# Patient Record
Sex: MALE | Race: WHITE | HISPANIC OR LATINO | Employment: FULL TIME | URBAN - METROPOLITAN AREA
[De-identification: names, ages, dates, MRNs, and addresses within clinical notes are randomized per-mention and may not be internally consistent; named-entity substitution may affect disease eponyms.]

---

## 2017-01-18 ENCOUNTER — HOSPITAL ENCOUNTER (EMERGENCY)
Facility: HOSPITAL | Age: 57
End: 2017-01-18
Attending: EMERGENCY MEDICINE
Payer: COMMERCIAL

## 2017-01-18 ENCOUNTER — APPOINTMENT (EMERGENCY)
Dept: RADIOLOGY | Facility: HOSPITAL | Age: 57
End: 2017-01-18
Payer: COMMERCIAL

## 2017-01-18 ENCOUNTER — HOSPITAL ENCOUNTER (INPATIENT)
Facility: HOSPITAL | Age: 57
LOS: 2 days | Discharge: HOME | DRG: 247 | End: 2017-01-20
Attending: INTERNAL MEDICINE | Admitting: INTERNAL MEDICINE
Payer: COMMERCIAL

## 2017-01-18 VITALS
BODY MASS INDEX: 32.64 KG/M2 | RESPIRATION RATE: 18 BRPM | HEIGHT: 68 IN | OXYGEN SATURATION: 96 % | DIASTOLIC BLOOD PRESSURE: 69 MMHG | HEART RATE: 62 BPM | SYSTOLIC BLOOD PRESSURE: 104 MMHG | WEIGHT: 215.39 LBS | TEMPERATURE: 98.6 F

## 2017-01-18 DIAGNOSIS — I25.10 CORONARY ARTERY DISEASE INVOLVING NATIVE CORONARY ARTERY: ICD-10-CM

## 2017-01-18 DIAGNOSIS — E11.9 TYPE 2 DIABETES MELLITUS (HCC): Primary | Chronic | ICD-10-CM

## 2017-01-18 DIAGNOSIS — I21.4 NSTEMI (NON-ST ELEVATION MYOCARDIAL INFARCTION) (HCC): ICD-10-CM

## 2017-01-18 DIAGNOSIS — I21.4 NSTEMI (NON-ST ELEVATED MYOCARDIAL INFARCTION) (HCC): Primary | ICD-10-CM

## 2017-01-18 PROBLEM — I10 HYPERTENSION: Status: ACTIVE | Noted: 2017-01-18

## 2017-01-18 PROBLEM — E78.5 DYSLIPIDEMIA: Chronic | Status: ACTIVE | Noted: 2017-01-18

## 2017-01-18 LAB
ANION GAP SERPL CALCULATED.3IONS-SCNC: 9 MMOL/L (ref 4–13)
ATRIAL RATE: 69 BPM
BASOPHILS # BLD AUTO: 0.02 THOUSANDS/ΜL (ref 0–0.1)
BASOPHILS NFR BLD AUTO: 0 % (ref 0–1)
BUN SERPL-MCNC: 23 MG/DL (ref 5–25)
CALCIUM SERPL-MCNC: 9.1 MG/DL (ref 8.3–10.1)
CHLORIDE SERPL-SCNC: 106 MMOL/L (ref 100–108)
CO2 SERPL-SCNC: 24 MMOL/L (ref 21–32)
CREAT SERPL-MCNC: 0.9 MG/DL (ref 0.6–1.3)
EOSINOPHIL # BLD AUTO: 0.06 THOUSAND/ΜL (ref 0–0.61)
EOSINOPHIL NFR BLD AUTO: 1 % (ref 0–6)
ERYTHROCYTE [DISTWIDTH] IN BLOOD BY AUTOMATED COUNT: 12.5 % (ref 11.6–15.1)
GFR SERPL CREATININE-BSD FRML MDRD: >60 ML/MIN/1.73SQ M
GLUCOSE SERPL-MCNC: 172 MG/DL (ref 65–140)
HCT VFR BLD AUTO: 36.8 % (ref 36.5–49.3)
HGB BLD-MCNC: 11.9 G/DL (ref 12–17)
LYMPHOCYTES # BLD AUTO: 2.12 THOUSANDS/ΜL (ref 0.6–4.47)
LYMPHOCYTES NFR BLD AUTO: 23 % (ref 14–44)
MCH RBC QN AUTO: 29.9 PG (ref 26.8–34.3)
MCHC RBC AUTO-ENTMCNC: 32.3 G/DL (ref 31.4–37.4)
MCV RBC AUTO: 93 FL (ref 82–98)
MONOCYTES # BLD AUTO: 0.7 THOUSAND/ΜL (ref 0.17–1.22)
MONOCYTES NFR BLD AUTO: 8 % (ref 4–12)
NEUTROPHILS # BLD AUTO: 6.27 THOUSANDS/ΜL (ref 1.85–7.62)
NEUTS SEG NFR BLD AUTO: 68 % (ref 43–75)
NRBC BLD AUTO-RTO: 0 /100 WBCS
P AXIS: 38 DEGREES
PLATELET # BLD AUTO: 192 THOUSANDS/UL (ref 149–390)
PMV BLD AUTO: 10.7 FL (ref 8.9–12.7)
POTASSIUM SERPL-SCNC: 4.2 MMOL/L (ref 3.5–5.3)
PR INTERVAL: 116 MS
QRS AXIS: 0 DEGREES
QRSD INTERVAL: 82 MS
QT INTERVAL: 350 MS
QTC INTERVAL: 375 MS
RBC # BLD AUTO: 3.98 MILLION/UL (ref 3.88–5.62)
SODIUM SERPL-SCNC: 139 MMOL/L (ref 136–145)
SPECIMEN SOURCE: ABNORMAL
SPECIMEN SOURCE: ABNORMAL
T WAVE AXIS: -49 DEGREES
TROPONIN I BLD-MCNC: 2.17 NG/ML (ref 0–0.08)
TROPONIN I BLD-MCNC: 3.1 NG/ML (ref 0–0.08)
TROPONIN I SERPL-MCNC: 3.74 NG/ML
TROPONIN I SERPL-MCNC: 4.16 NG/ML
VENTRICULAR RATE: 69 BPM
WBC # BLD AUTO: 9.2 THOUSAND/UL (ref 4.31–10.16)

## 2017-01-18 PROCEDURE — 93005 ELECTROCARDIOGRAM TRACING: CPT | Performed by: INTERNAL MEDICINE

## 2017-01-18 PROCEDURE — 99285 EMERGENCY DEPT VISIT HI MDM: CPT

## 2017-01-18 PROCEDURE — 85025 COMPLETE CBC W/AUTO DIFF WBC: CPT | Performed by: EMERGENCY MEDICINE

## 2017-01-18 PROCEDURE — 80048 BASIC METABOLIC PNL TOTAL CA: CPT | Performed by: EMERGENCY MEDICINE

## 2017-01-18 PROCEDURE — 84484 ASSAY OF TROPONIN QUANT: CPT

## 2017-01-18 PROCEDURE — 36415 COLL VENOUS BLD VENIPUNCTURE: CPT | Performed by: EMERGENCY MEDICINE

## 2017-01-18 PROCEDURE — 84484 ASSAY OF TROPONIN QUANT: CPT | Performed by: INTERNAL MEDICINE

## 2017-01-18 PROCEDURE — 93005 ELECTROCARDIOGRAM TRACING: CPT | Performed by: EMERGENCY MEDICINE

## 2017-01-18 PROCEDURE — 71020 HB CHEST X-RAY 2VW FRONTAL&LATL: CPT

## 2017-01-18 RX ORDER — NITROGLYCERIN 0.4 MG/1
0.4 TABLET SUBLINGUAL
Status: DISCONTINUED | OUTPATIENT
Start: 2017-01-18 | End: 2017-01-20 | Stop reason: HOSPADM

## 2017-01-18 RX ORDER — CLOPIDOGREL BISULFATE 75 MG/1
300 TABLET ORAL ONCE
Status: COMPLETED | OUTPATIENT
Start: 2017-01-18 | End: 2017-01-18

## 2017-01-18 RX ORDER — SIMVASTATIN 40 MG
40 TABLET ORAL
COMMUNITY
End: 2017-01-20 | Stop reason: HOSPADM

## 2017-01-18 RX ORDER — CLOPIDOGREL BISULFATE 75 MG/1
600 TABLET ORAL ONCE
Status: DISCONTINUED | OUTPATIENT
Start: 2017-01-18 | End: 2017-01-18

## 2017-01-18 RX ORDER — ENALAPRIL MALEATE 5 MG/1
5 TABLET ORAL DAILY
COMMUNITY

## 2017-01-18 RX ORDER — CLOPIDOGREL BISULFATE 75 MG/1
75 TABLET ORAL DAILY
Status: DISCONTINUED | OUTPATIENT
Start: 2017-01-19 | End: 2017-01-19

## 2017-01-18 RX ORDER — ATORVASTATIN CALCIUM 80 MG/1
80 TABLET, FILM COATED ORAL
Status: DISCONTINUED | OUTPATIENT
Start: 2017-01-19 | End: 2017-01-20 | Stop reason: HOSPADM

## 2017-01-18 RX ORDER — ASPIRIN 325 MG
325 TABLET ORAL DAILY
Status: DISCONTINUED | OUTPATIENT
Start: 2017-01-19 | End: 2017-01-19

## 2017-01-18 RX ORDER — MELATONIN
1000 DAILY
Status: DISCONTINUED | OUTPATIENT
Start: 2017-01-19 | End: 2017-01-20 | Stop reason: HOSPADM

## 2017-01-18 RX ORDER — BIOTIN 1 MG
1 TABLET ORAL DAILY
COMMUNITY

## 2017-01-18 RX ORDER — SODIUM CHLORIDE 9 MG/ML
125 INJECTION, SOLUTION INTRAVENOUS CONTINUOUS
Status: DISCONTINUED | OUTPATIENT
Start: 2017-01-19 | End: 2017-01-20

## 2017-01-18 RX ORDER — ASPIRIN 325 MG
325 TABLET ORAL ONCE
Status: COMPLETED | OUTPATIENT
Start: 2017-01-18 | End: 2017-01-18

## 2017-01-18 RX ORDER — MORPHINE SULFATE 2 MG/ML
2 INJECTION, SOLUTION INTRAMUSCULAR; INTRAVENOUS EVERY 6 HOURS PRN
Status: DISCONTINUED | OUTPATIENT
Start: 2017-01-18 | End: 2017-01-20 | Stop reason: HOSPADM

## 2017-01-18 RX ORDER — MULTIVITAMIN
1 TABLET ORAL DAILY
COMMUNITY

## 2017-01-18 RX ADMIN — CLOPIDOGREL 300 MG: 75 TABLET, FILM COATED ORAL at 14:24

## 2017-01-18 RX ADMIN — METOPROLOL TARTRATE 25 MG: 25 TABLET ORAL at 21:42

## 2017-01-18 RX ADMIN — ASPIRIN 325 MG: 325 TABLET ORAL at 14:24

## 2017-01-18 RX ADMIN — ENOXAPARIN SODIUM 100 MG: 100 INJECTION SUBCUTANEOUS at 21:42

## 2017-01-19 ENCOUNTER — APPOINTMENT (INPATIENT)
Dept: NON INVASIVE DIAGNOSTICS | Facility: HOSPITAL | Age: 57
DRG: 247 | End: 2017-01-19
Payer: COMMERCIAL

## 2017-01-19 LAB
ANION GAP SERPL CALCULATED.3IONS-SCNC: 6 MMOL/L (ref 4–13)
ATRIAL RATE: 63 BPM
ATRIAL RATE: 67 BPM
BUN SERPL-MCNC: 18 MG/DL (ref 5–25)
CALCIUM SERPL-MCNC: 8.1 MG/DL (ref 8.3–10.1)
CHLORIDE SERPL-SCNC: 107 MMOL/L (ref 100–108)
CHOLEST SERPL-MCNC: 115 MG/DL (ref 50–200)
CO2 SERPL-SCNC: 27 MMOL/L (ref 21–32)
CREAT SERPL-MCNC: 0.76 MG/DL (ref 0.6–1.3)
EST. AVERAGE GLUCOSE BLD GHB EST-MCNC: 114 MG/DL
GFR SERPL CREATININE-BSD FRML MDRD: >60 ML/MIN/1.73SQ M
GLUCOSE SERPL-MCNC: 106 MG/DL (ref 65–140)
GLUCOSE SERPL-MCNC: 116 MG/DL (ref 65–140)
GLUCOSE SERPL-MCNC: 124 MG/DL (ref 65–140)
GLUCOSE SERPL-MCNC: 192 MG/DL (ref 65–140)
GLUCOSE SERPL-MCNC: 92 MG/DL (ref 65–140)
HBA1C MFR BLD: 5.6 % (ref 4.2–6.3)
HDLC SERPL-MCNC: 40 MG/DL (ref 40–60)
INR PPP: 1.14 (ref 0.86–1.16)
KCT BLD-ACNC: 235 SEC (ref 89–137)
LDLC SERPL CALC-MCNC: 51 MG/DL (ref 0–100)
MAGNESIUM SERPL-MCNC: 1.7 MG/DL (ref 1.6–2.6)
P AXIS: 52 DEGREES
P AXIS: 64 DEGREES
POTASSIUM SERPL-SCNC: 3.5 MMOL/L (ref 3.5–5.3)
PR INTERVAL: 124 MS
PR INTERVAL: 136 MS
PROTHROMBIN TIME: 14.7 SECONDS (ref 12–14.3)
QRS AXIS: -11 DEGREES
QRS AXIS: 15 DEGREES
QRSD INTERVAL: 86 MS
QRSD INTERVAL: 94 MS
QT INTERVAL: 400 MS
QT INTERVAL: 416 MS
QTC INTERVAL: 409 MS
QTC INTERVAL: 439 MS
SODIUM SERPL-SCNC: 140 MMOL/L (ref 136–145)
SPECIMEN SOURCE: ABNORMAL
T WAVE AXIS: -67 DEGREES
T WAVE AXIS: 12 DEGREES
TRIGL SERPL-MCNC: 118 MG/DL
VENTRICULAR RATE: 63 BPM
VENTRICULAR RATE: 67 BPM

## 2017-01-19 PROCEDURE — C1725 CATH, TRANSLUMIN NON-LASER: HCPCS | Performed by: INTERNAL MEDICINE

## 2017-01-19 PROCEDURE — C9600 PERC DRUG-EL COR STENT SING: HCPCS | Performed by: INTERNAL MEDICINE

## 2017-01-19 PROCEDURE — 83735 ASSAY OF MAGNESIUM: CPT | Performed by: INTERNAL MEDICINE

## 2017-01-19 PROCEDURE — C1894 INTRO/SHEATH, NON-LASER: HCPCS | Performed by: INTERNAL MEDICINE

## 2017-01-19 PROCEDURE — 85347 COAGULATION TIME ACTIVATED: CPT

## 2017-01-19 PROCEDURE — C1887 CATHETER, GUIDING: HCPCS | Performed by: INTERNAL MEDICINE

## 2017-01-19 PROCEDURE — 93454 CORONARY ARTERY ANGIO S&I: CPT | Performed by: INTERNAL MEDICINE

## 2017-01-19 PROCEDURE — 99153 MOD SED SAME PHYS/QHP EA: CPT | Performed by: INTERNAL MEDICINE

## 2017-01-19 PROCEDURE — C1874 STENT, COATED/COV W/DEL SYS: HCPCS

## 2017-01-19 PROCEDURE — C1769 GUIDE WIRE: HCPCS | Performed by: INTERNAL MEDICINE

## 2017-01-19 PROCEDURE — B2111ZZ FLUOROSCOPY OF MULTIPLE CORONARY ARTERIES USING LOW OSMOLAR CONTRAST: ICD-10-PCS | Performed by: INTERNAL MEDICINE

## 2017-01-19 PROCEDURE — 80061 LIPID PANEL: CPT | Performed by: INTERNAL MEDICINE

## 2017-01-19 PROCEDURE — 80048 BASIC METABOLIC PNL TOTAL CA: CPT | Performed by: INTERNAL MEDICINE

## 2017-01-19 PROCEDURE — 85610 PROTHROMBIN TIME: CPT | Performed by: INTERNAL MEDICINE

## 2017-01-19 PROCEDURE — 99152 MOD SED SAME PHYS/QHP 5/>YRS: CPT | Performed by: INTERNAL MEDICINE

## 2017-01-19 PROCEDURE — 027135Z DILATION OF CORONARY ARTERY, TWO ARTERIES WITH TWO DRUG-ELUTING INTRALUMINAL DEVICES, PERCUTANEOUS APPROACH: ICD-10-PCS | Performed by: INTERNAL MEDICINE

## 2017-01-19 PROCEDURE — 83036 HEMOGLOBIN GLYCOSYLATED A1C: CPT | Performed by: INTERNAL MEDICINE

## 2017-01-19 PROCEDURE — 82948 REAGENT STRIP/BLOOD GLUCOSE: CPT

## 2017-01-19 PROCEDURE — 93306 TTE W/DOPPLER COMPLETE: CPT

## 2017-01-19 RX ORDER — MIDAZOLAM HYDROCHLORIDE 1 MG/ML
INJECTION INTRAMUSCULAR; INTRAVENOUS CODE/TRAUMA/SEDATION MEDICATION
Status: COMPLETED | OUTPATIENT
Start: 2017-01-19 | End: 2017-01-19

## 2017-01-19 RX ORDER — ASPIRIN 81 MG/1
81 TABLET, CHEWABLE ORAL DAILY
Status: DISCONTINUED | OUTPATIENT
Start: 2017-01-20 | End: 2017-01-20

## 2017-01-19 RX ORDER — FENTANYL CITRATE 50 UG/ML
INJECTION, SOLUTION INTRAMUSCULAR; INTRAVENOUS CODE/TRAUMA/SEDATION MEDICATION
Status: COMPLETED | OUTPATIENT
Start: 2017-01-19 | End: 2017-01-19

## 2017-01-19 RX ORDER — LIDOCAINE HYDROCHLORIDE 10 MG/ML
INJECTION, SOLUTION INFILTRATION; PERINEURAL CODE/TRAUMA/SEDATION MEDICATION
Status: COMPLETED | OUTPATIENT
Start: 2017-01-19 | End: 2017-01-19

## 2017-01-19 RX ORDER — HEPARIN SODIUM 1000 [USP'U]/ML
INJECTION, SOLUTION INTRAVENOUS; SUBCUTANEOUS CODE/TRAUMA/SEDATION MEDICATION
Status: COMPLETED | OUTPATIENT
Start: 2017-01-19 | End: 2017-01-19

## 2017-01-19 RX ORDER — VERAPAMIL HCL 2.5 MG/ML
AMPUL (ML) INTRAVENOUS CODE/TRAUMA/SEDATION MEDICATION
Status: COMPLETED | OUTPATIENT
Start: 2017-01-19 | End: 2017-01-19

## 2017-01-19 RX ORDER — CLOPIDOGREL BISULFATE 75 MG/1
300 TABLET ORAL ONCE
Status: COMPLETED | OUTPATIENT
Start: 2017-01-19 | End: 2017-01-19

## 2017-01-19 RX ORDER — SODIUM CHLORIDE 9 MG/ML
100 INJECTION, SOLUTION INTRAVENOUS CONTINUOUS
Status: DISCONTINUED | OUTPATIENT
Start: 2017-01-19 | End: 2017-01-20

## 2017-01-19 RX ORDER — NITROGLYCERIN 20 MG/100ML
INJECTION INTRAVENOUS CODE/TRAUMA/SEDATION MEDICATION
Status: COMPLETED | OUTPATIENT
Start: 2017-01-19 | End: 2017-01-19

## 2017-01-19 RX ORDER — CLOPIDOGREL BISULFATE 75 MG/1
75 TABLET ORAL DAILY
Status: DISCONTINUED | OUTPATIENT
Start: 2017-01-20 | End: 2017-01-20 | Stop reason: HOSPADM

## 2017-01-19 RX ADMIN — PERFLUTREN 0.6 ML/MIN: 6.52 INJECTION, SUSPENSION INTRAVENOUS at 10:36

## 2017-01-19 RX ADMIN — CLOPIDOGREL BISULFATE 300 MG: 75 TABLET ORAL at 08:15

## 2017-01-19 RX ADMIN — MIDAZOLAM HYDROCHLORIDE 1 MG: 1 INJECTION, SOLUTION INTRAMUSCULAR; INTRAVENOUS at 13:05

## 2017-01-19 RX ADMIN — METOPROLOL TARTRATE 25 MG: 25 TABLET ORAL at 17:43

## 2017-01-19 RX ADMIN — FENTANYL CITRATE 50 MCG: 50 INJECTION, SOLUTION INTRAMUSCULAR; INTRAVENOUS at 12:48

## 2017-01-19 RX ADMIN — HEPARIN SODIUM 4000 UNITS: 1000 INJECTION INTRAVENOUS; SUBCUTANEOUS at 13:01

## 2017-01-19 RX ADMIN — MIDAZOLAM HYDROCHLORIDE 2 MG: 1 INJECTION, SOLUTION INTRAMUSCULAR; INTRAVENOUS at 13:15

## 2017-01-19 RX ADMIN — LIDOCAINE HYDROCHLORIDE 1 ML: 10 INJECTION, SOLUTION INFILTRATION; PERINEURAL at 12:53

## 2017-01-19 RX ADMIN — HEPARIN SODIUM 4000 UNITS: 1000 INJECTION INTRAVENOUS; SUBCUTANEOUS at 12:54

## 2017-01-19 RX ADMIN — SODIUM CHLORIDE 125 ML/HR: 0.9 INJECTION, SOLUTION INTRAVENOUS at 01:03

## 2017-01-19 RX ADMIN — IOHEXOL 170 ML: 350 INJECTION, SOLUTION INTRAVENOUS at 13:38

## 2017-01-19 RX ADMIN — FENTANYL CITRATE 50 MCG: 50 INJECTION, SOLUTION INTRAMUSCULAR; INTRAVENOUS at 13:15

## 2017-01-19 RX ADMIN — VERAPAMIL HYDROCHLORIDE 1.25 MG: 2.5 INJECTION, SOLUTION INTRAVENOUS at 12:54

## 2017-01-19 RX ADMIN — ENOXAPARIN SODIUM 100 MG: 100 INJECTION SUBCUTANEOUS at 21:57

## 2017-01-19 RX ADMIN — HEPARIN SODIUM 3000 UNITS: 1000 INJECTION INTRAVENOUS; SUBCUTANEOUS at 13:25

## 2017-01-19 RX ADMIN — INSULIN LISPRO 1 UNITS: 100 INJECTION, SOLUTION INTRAVENOUS; SUBCUTANEOUS at 17:53

## 2017-01-19 RX ADMIN — METOPROLOL TARTRATE 25 MG: 25 TABLET ORAL at 08:15

## 2017-01-19 RX ADMIN — ATORVASTATIN CALCIUM 80 MG: 80 TABLET, FILM COATED ORAL at 17:43

## 2017-01-19 RX ADMIN — ASPIRIN 325 MG: 325 TABLET ORAL at 08:15

## 2017-01-19 RX ADMIN — NITROGLYCERIN 200 MCG: 20 INJECTION INTRAVENOUS at 12:55

## 2017-01-19 RX ADMIN — SODIUM CHLORIDE 100 ML/HR: 0.9 INJECTION, SOLUTION INTRAVENOUS at 17:49

## 2017-01-19 RX ADMIN — FENTANYL CITRATE 25 MCG: 50 INJECTION, SOLUTION INTRAMUSCULAR; INTRAVENOUS at 13:05

## 2017-01-19 RX ADMIN — MIDAZOLAM HYDROCHLORIDE 2 MG: 1 INJECTION, SOLUTION INTRAMUSCULAR; INTRAVENOUS at 12:48

## 2017-01-20 VITALS
RESPIRATION RATE: 17 BRPM | OXYGEN SATURATION: 93 % | HEIGHT: 68 IN | HEART RATE: 67 BPM | WEIGHT: 210.76 LBS | SYSTOLIC BLOOD PRESSURE: 125 MMHG | BODY MASS INDEX: 31.94 KG/M2 | DIASTOLIC BLOOD PRESSURE: 75 MMHG | TEMPERATURE: 97.8 F

## 2017-01-20 PROBLEM — I25.10 CORONARY ARTERY DISEASE INVOLVING NATIVE CORONARY ARTERY: Status: ACTIVE | Noted: 2017-01-20

## 2017-01-20 LAB
ANION GAP SERPL CALCULATED.3IONS-SCNC: 6 MMOL/L (ref 4–13)
BUN SERPL-MCNC: 16 MG/DL (ref 5–25)
CALCIUM SERPL-MCNC: 8 MG/DL (ref 8.3–10.1)
CHLORIDE SERPL-SCNC: 107 MMOL/L (ref 100–108)
CHOLEST SERPL-MCNC: 106 MG/DL (ref 50–200)
CO2 SERPL-SCNC: 27 MMOL/L (ref 21–32)
CREAT SERPL-MCNC: 0.81 MG/DL (ref 0.6–1.3)
ERYTHROCYTE [DISTWIDTH] IN BLOOD BY AUTOMATED COUNT: 12.5 % (ref 11.6–15.1)
GFR SERPL CREATININE-BSD FRML MDRD: >60 ML/MIN/1.73SQ M
GLUCOSE SERPL-MCNC: 107 MG/DL (ref 65–140)
GLUCOSE SERPL-MCNC: 116 MG/DL (ref 65–140)
HCT VFR BLD AUTO: 32.7 % (ref 36.5–49.3)
HDLC SERPL-MCNC: 40 MG/DL (ref 40–60)
HGB BLD-MCNC: 11 G/DL (ref 12–17)
LDLC SERPL CALC-MCNC: 51 MG/DL (ref 0–100)
MAGNESIUM SERPL-MCNC: 1.8 MG/DL (ref 1.6–2.6)
MCH RBC QN AUTO: 30.8 PG (ref 26.8–34.3)
MCHC RBC AUTO-ENTMCNC: 33.6 G/DL (ref 31.4–37.4)
MCV RBC AUTO: 92 FL (ref 82–98)
PLATELET # BLD AUTO: 159 THOUSANDS/UL (ref 149–390)
PMV BLD AUTO: 11.1 FL (ref 8.9–12.7)
POTASSIUM SERPL-SCNC: 3.8 MMOL/L (ref 3.5–5.3)
RBC # BLD AUTO: 3.57 MILLION/UL (ref 3.88–5.62)
SODIUM SERPL-SCNC: 140 MMOL/L (ref 136–145)
TRIGL SERPL-MCNC: 77 MG/DL
WBC # BLD AUTO: 5.64 THOUSAND/UL (ref 4.31–10.16)

## 2017-01-20 PROCEDURE — 83735 ASSAY OF MAGNESIUM: CPT | Performed by: INTERNAL MEDICINE

## 2017-01-20 PROCEDURE — 80061 LIPID PANEL: CPT | Performed by: INTERNAL MEDICINE

## 2017-01-20 PROCEDURE — 82948 REAGENT STRIP/BLOOD GLUCOSE: CPT

## 2017-01-20 PROCEDURE — 85027 COMPLETE CBC AUTOMATED: CPT | Performed by: INTERNAL MEDICINE

## 2017-01-20 PROCEDURE — 80048 BASIC METABOLIC PNL TOTAL CA: CPT | Performed by: INTERNAL MEDICINE

## 2017-01-20 RX ORDER — CLOPIDOGREL BISULFATE 75 MG/1
75 TABLET ORAL DAILY
Qty: 30 TABLET | Refills: 6 | Status: SHIPPED | OUTPATIENT
Start: 2017-01-20 | End: 2017-02-19

## 2017-01-20 RX ORDER — ENALAPRIL MALEATE 5 MG/1
5 TABLET ORAL DAILY
Status: DISCONTINUED | OUTPATIENT
Start: 2017-01-20 | End: 2017-01-20 | Stop reason: HOSPADM

## 2017-01-20 RX ORDER — ATORVASTATIN CALCIUM 40 MG/1
40 TABLET, FILM COATED ORAL DAILY
Qty: 30 TABLET | Refills: 0 | Status: SHIPPED | OUTPATIENT
Start: 2017-01-20 | End: 2017-02-19

## 2017-01-20 RX ORDER — ASPIRIN 325 MG
325 TABLET, DELAYED RELEASE (ENTERIC COATED) ORAL DAILY
Status: DISCONTINUED | OUTPATIENT
Start: 2017-01-20 | End: 2017-01-20 | Stop reason: HOSPADM

## 2017-01-20 RX ORDER — NITROGLYCERIN 0.4 MG/1
0.4 TABLET SUBLINGUAL
Qty: 90 TABLET | Refills: 3 | Status: SHIPPED | OUTPATIENT
Start: 2017-01-20 | End: 2017-02-19

## 2017-01-20 RX ORDER — ATORVASTATIN CALCIUM 80 MG/1
40 TABLET, FILM COATED ORAL
Qty: 15 TABLET | Refills: 6 | Status: CANCELLED | OUTPATIENT
Start: 2017-01-20 | End: 2017-02-19

## 2017-01-20 RX ADMIN — VITAMIN D, TAB 1000IU (100/BT) 1000 UNITS: 25 TAB at 08:51

## 2017-01-20 RX ADMIN — CLOPIDOGREL BISULFATE 75 MG: 75 TABLET ORAL at 08:51

## 2017-01-20 RX ADMIN — SODIUM CHLORIDE 125 ML/HR: 0.9 INJECTION, SOLUTION INTRAVENOUS at 06:26

## 2017-01-20 RX ADMIN — ASPIRIN 325 MG: 325 TABLET, DELAYED RELEASE ORAL at 08:51

## 2017-01-20 RX ADMIN — METOPROLOL TARTRATE 25 MG: 25 TABLET ORAL at 08:51

## 2017-02-28 ENCOUNTER — ALLSCRIPTS OFFICE VISIT (OUTPATIENT)
Dept: OTHER | Facility: OTHER | Age: 57
End: 2017-02-28

## 2017-08-24 ENCOUNTER — ALLSCRIPTS OFFICE VISIT (OUTPATIENT)
Dept: OTHER | Facility: OTHER | Age: 57
End: 2017-08-24

## 2017-10-24 NOTE — PROGRESS NOTES
Assessment  Assessed    1  CAD (coronary artery disease) (414 00) (I25 10)   2  Non-ST elevation MI (NSTEMI) (410 70) (I21 4)   3  Benign hypertension (401 1) (I10)   4  Hyperlipidemia (272 4) (E78 5)      #1  Coronary artery disease, an STEMI, status post drug-eluting stents  She is on maximum medical management for CAD, I will switch him to aspirin 81 mg and increase Lipitor to 80 mg once daily  Continue beta blocker  Plavix for 1 year at least   #2  Hypertension well controlled  Plan    Follow-up in 6 months     Discussion/Summary  Cardiology Discussion Summary Free Text Note Form [facility]:   CAD status post NSTEMI with JULIA placement LAD and OM1- patient asymptomatic anginal symptoms  Will continue medical management with aspirin, atorvastatin, Plavix, enalapril, metoprolol  Continue nitro p r n  Olivia Folk Importance of medication compliance discussed with patient  Signs and symptoms of ACS/CAD reviewed with patient  He was advised to report any concerning symptoms  well controlled on current med regimen will continue same  atorvastatin  follow up with patient in 6 months  Chief Complaint  Chief Complaint Chronic Condition [facility]: Patient is here today for follow up of chronic conditions described in HPI  History of Present Illness  Cardiology HPI Free Text Note Form [facility]: Patient with history of CAD/NSTEMI status post JULIA to LAD and OM1, hypertension, hyperlipidemia  Patient reports compliance with all his medications  He reports that he is healing well and is able to do his normal daily activities without difficulty  He denies chest pain, palpitations, shortness of breath, dizziness, edema, orthopnea, PND  He denies any hematuria, dark tarry or bloody stool, frequent nose bleeds  Review of Systems  Cardiology Male ROS:     Cardiac: No complaints of chest pain, no palpitations, no fainiting  Skin: No complaints of nonhealing sores or skin rash     Genitourinary: no blood in urine   Psychological: No complaints of feeling depressed, anxiety, panic attacks, or difficulty concentrating  General: No complaints of trouble sleeping, lack of energy, fatigue, appetite changes, weight changes, fever, frequent infections, or night sweats  Respiratory: No complaints of shortness of breath, cough with sputum, or wheezing  HEENT: no snoring   Gastrointestinal: No complaints of liver problems, nausea, vomiting, heartburn, constipation, bloody stools, diarrhea, problems swallowing, adbominal pain, or rectal bleeding  Hematologic: No complaints of bleeding disorders, anemia, blood clots, or excessive brusing  Neurological: No complaints of numbness, tingling, dizziness, weakness, seizures, headaches, syncope or fainting, AM fatigue, daytime sleepiness, no witnessed apnea episodes  Musculoskeletal: No complaints of arthritis, back pain, or painfull swelling  ROS Reviewed:   ROS reviewed  Active Problems  Problems    1  CAD (coronary artery disease) (414 00) (I25 10)   2  Non-ST elevation MI (NSTEMI) (410 70) (I21 4)    Past Medical History  Problems    1  History of Coronary artery disease involving native coronary artery of native heart without   angina pectoris (414 01) (I25 10)   2  History of Non-ST elevation myocardial infarction (NSTEMI) in recovery phase   3  History of Type 2 diabetes mellitus without complication, unspecified long term insulin   use status (250 00) (E11 9)  Active Problems And Past Medical History Reviewed: The active problems and past medical history were reviewed and updated today  Surgical History  Problems    1  History of Cath Stent 1 First Obtuse Marginal Branch   2  History of Cath Stent 1 Mid-Left Anterior Descending Artery  Surgical History Reviewed: The surgical history was reviewed and updated today  Family History  Mother    1  No pertinent family history  Father    2  No pertinent family history  Sibling    3   No pertinent family history  Family History Reviewed: The family history was reviewed and updated today  Social History  Problems    · Current smoker (305 1) (F17 200)   · No alcohol use   · No illicit drug use  Social History Reviewed: The social history was reviewed and updated today  The social history was reviewed and is unchanged  Current Meds   1  Aspirin 81 MG Oral Tablet Delayed Release; TAKE 1 TABLET DAILY; Therapy: [id number] to (Evaluate:[id number])  Requested for: 34HBM2834; Last   Rx:[id number] Ordered   2  Atorvastatin Calcium 80 MG Oral Tablet; TAKE 1 TABLET AT BEDTIME; Therapy: (Ramón Caldera) to Recorded   3  Clopidogrel Bisulfate 75 MG Oral Tablet; Take 1 tablet daily Recorded   4  Enalapril Maleate 5 MG Oral Tablet; Take 1 tablet daily Recorded   5  MetFORMIN HCl - 500 MG Oral Tablet; Take 1 tablet daily Recorded   6  Metoprolol Tartrate 25 MG Oral Tablet; Take 1 tablet twice daily Recorded   7  Multivitamins Oral Capsule; TAKE 1 CAPSULE Daily in the [facility] Recorded   8  Nitroglycerin 0 4 MG Sublingual Tablet Sublingual; DISSOLVE 1 TABLET UNDER THE   TONGUE AS NEEDED FOR CHEST PAIN Recorded   9  Vitamin D3 1000 UNIT Oral Capsule; Take as directed Recorded  Medication List Reviewed: The medication list was reviewed and updated today  Allergies  Medication    1  No Known Drug Allergies    Vitals  Vital Signs    Recorded: 54Vaj1059 04:14PM   Heart Rate 68   Systolic 506, LUE, Sitting   Diastolic 84, LUE, Sitting   Height 5 ft    Weight 209 lb 8 0 oz   BMI Calculated 40 92   BSA Calculated 1 9   O2 Saturation 96     Physical Exam    Constitutional   General appearance: No acute distress, well appearing and well nourished  Eyes   Conjunctiva and Sclera examination: Conjunctiva pink, sclera anicteric  Ears, Nose, Mouth, and Throat - Oropharynx: Clear, nares are clear, mucous membranes are moist    Neck   Neck and thyroid: Normal, supple, trachea midline, no thyromegaly      Pulmonary   Respiratory effort: No increased work of breathing or signs of respiratory distress  Auscultation of lungs: Clear to auscultation, no rales, no rhonchi, no wheezing, good air movement  Cardiovascular   Auscultation of heart: Normal rate and rhythm, normal S1 and S2, no murmurs  Carotid pulses: Normal, 2+ bilaterally  Examination of extremities for edema and/or varicosities: Normal     Abdomen   Abdomen: Non-tender and no distention  Musculoskeletal Gait and station: Normal gait  -- Digits and nails: Normal without clubbing or cyanosis  Skin - Skin and subcutaneous tissue: Normal without rashes or lesions  Skin is warm and well perfused, normal turgor  Neurologic - Speech: Normal     Psychiatric - Orientation to person, place, and time: Normal -- Mood and affect: Normal       Signatures   Electronically signed by : Epifanio Dawson, [facility];  Aug 24 2017  4:43PM EST                       (Author)    Electronically signed by : AME Plascencia ; Aug 29 2017  4:43PM EST                       (Author)

## 2018-01-12 VITALS
HEART RATE: 68 BPM | BODY MASS INDEX: 41.13 KG/M2 | WEIGHT: 209.5 LBS | HEIGHT: 60 IN | SYSTOLIC BLOOD PRESSURE: 134 MMHG | DIASTOLIC BLOOD PRESSURE: 84 MMHG | OXYGEN SATURATION: 96 %

## 2018-01-13 VITALS
WEIGHT: 210 LBS | DIASTOLIC BLOOD PRESSURE: 74 MMHG | OXYGEN SATURATION: 97 % | BODY MASS INDEX: 41.23 KG/M2 | SYSTOLIC BLOOD PRESSURE: 126 MMHG | HEART RATE: 74 BPM | HEIGHT: 60 IN

## 2024-04-05 ENCOUNTER — APPOINTMENT (EMERGENCY)
Dept: RADIOLOGY | Facility: HOSPITAL | Age: 64
End: 2024-04-05
Payer: COMMERCIAL

## 2024-04-05 ENCOUNTER — APPOINTMENT (EMERGENCY)
Dept: CT IMAGING | Facility: HOSPITAL | Age: 64
End: 2024-04-05
Payer: COMMERCIAL

## 2024-04-05 ENCOUNTER — HOSPITAL ENCOUNTER (EMERGENCY)
Facility: HOSPITAL | Age: 64
Discharge: HOME | End: 2024-04-05
Attending: EMERGENCY MEDICINE
Payer: COMMERCIAL

## 2024-04-05 VITALS
SYSTOLIC BLOOD PRESSURE: 158 MMHG | RESPIRATION RATE: 16 BRPM | DIASTOLIC BLOOD PRESSURE: 71 MMHG | HEART RATE: 64 BPM | TEMPERATURE: 98.4 F | OXYGEN SATURATION: 93 %

## 2024-04-05 DIAGNOSIS — R10.9 NONSPECIFIC ABDOMINAL PAIN: Primary | ICD-10-CM

## 2024-04-05 DIAGNOSIS — R91.8 PULMONARY NODULES: ICD-10-CM

## 2024-04-05 LAB
2HR DELTA HS TROPONIN: 0 NG/L
ALBUMIN SERPL BCP-MCNC: 3.8 G/DL (ref 3.5–5)
ALP SERPL-CCNC: 49 U/L (ref 34–104)
ALT SERPL W P-5'-P-CCNC: 65 U/L (ref 7–52)
ANION GAP SERPL CALCULATED.3IONS-SCNC: 5 MMOL/L (ref 4–13)
AST SERPL W P-5'-P-CCNC: 45 U/L (ref 13–39)
ATRIAL RATE: 70 BPM
BASOPHILS # BLD AUTO: 0.02 THOUSANDS/ÂΜL (ref 0–0.1)
BASOPHILS NFR BLD AUTO: 0 % (ref 0–1)
BILIRUB DIRECT SERPL-MCNC: 0.12 MG/DL (ref 0–0.2)
BILIRUB SERPL-MCNC: 0.53 MG/DL (ref 0.2–1)
BUN SERPL-MCNC: 13 MG/DL (ref 5–25)
CALCIUM SERPL-MCNC: 8.8 MG/DL (ref 8.4–10.2)
CARDIAC TROPONIN I PNL SERPL HS: 6 NG/L
CARDIAC TROPONIN I PNL SERPL HS: 6 NG/L
CHLORIDE SERPL-SCNC: 103 MMOL/L (ref 96–108)
CO2 SERPL-SCNC: 27 MMOL/L (ref 21–32)
CREAT SERPL-MCNC: 0.75 MG/DL (ref 0.6–1.3)
EOSINOPHIL # BLD AUTO: 0.33 THOUSAND/ÂΜL (ref 0–0.61)
EOSINOPHIL NFR BLD AUTO: 5 % (ref 0–6)
ERYTHROCYTE [DISTWIDTH] IN BLOOD BY AUTOMATED COUNT: 11.9 % (ref 11.6–15.1)
GFR SERPL CREATININE-BSD FRML MDRD: 96 ML/MIN/1.73SQ M
GLUCOSE SERPL-MCNC: 183 MG/DL (ref 65–140)
HCT VFR BLD AUTO: 44.3 % (ref 36.5–49.3)
HGB BLD-MCNC: 14.8 G/DL (ref 12–17)
IMM GRANULOCYTES # BLD AUTO: 0.01 THOUSAND/UL (ref 0–0.2)
IMM GRANULOCYTES NFR BLD AUTO: 0 % (ref 0–2)
LIPASE SERPL-CCNC: 22 U/L (ref 11–82)
LYMPHOCYTES # BLD AUTO: 1.32 THOUSANDS/ÂΜL (ref 0.6–4.47)
LYMPHOCYTES NFR BLD AUTO: 22 % (ref 14–44)
MCH RBC QN AUTO: 32 PG (ref 26.8–34.3)
MCHC RBC AUTO-ENTMCNC: 33.4 G/DL (ref 31.4–37.4)
MCV RBC AUTO: 96 FL (ref 82–98)
MONOCYTES # BLD AUTO: 0.42 THOUSAND/ÂΜL (ref 0.17–1.22)
MONOCYTES NFR BLD AUTO: 7 % (ref 4–12)
NEUTROPHILS # BLD AUTO: 3.98 THOUSANDS/ÂΜL (ref 1.85–7.62)
NEUTS SEG NFR BLD AUTO: 66 % (ref 43–75)
NRBC BLD AUTO-RTO: 0 /100 WBCS
P AXIS: 61 DEGREES
PLATELET # BLD AUTO: 190 THOUSANDS/UL (ref 149–390)
PMV BLD AUTO: 9.7 FL (ref 8.9–12.7)
POTASSIUM SERPL-SCNC: 4.2 MMOL/L (ref 3.5–5.3)
PR INTERVAL: 128 MS
PROT SERPL-MCNC: 7 G/DL (ref 6.4–8.4)
QRS AXIS: -71 DEGREES
QRSD INTERVAL: 102 MS
QT INTERVAL: 412 MS
QTC INTERVAL: 444 MS
RBC # BLD AUTO: 4.62 MILLION/UL (ref 3.88–5.62)
SODIUM SERPL-SCNC: 135 MMOL/L (ref 135–147)
T WAVE AXIS: 8 DEGREES
VENTRICULAR RATE: 70 BPM
WBC # BLD AUTO: 6.08 THOUSAND/UL (ref 4.31–10.16)

## 2024-04-05 PROCEDURE — 99285 EMERGENCY DEPT VISIT HI MDM: CPT | Performed by: PHYSICIAN ASSISTANT

## 2024-04-05 PROCEDURE — 93005 ELECTROCARDIOGRAM TRACING: CPT

## 2024-04-05 PROCEDURE — 71045 X-RAY EXAM CHEST 1 VIEW: CPT

## 2024-04-05 PROCEDURE — 93010 ELECTROCARDIOGRAM REPORT: CPT | Performed by: INTERNAL MEDICINE

## 2024-04-05 PROCEDURE — 85025 COMPLETE CBC W/AUTO DIFF WBC: CPT | Performed by: PHYSICIAN ASSISTANT

## 2024-04-05 PROCEDURE — 83690 ASSAY OF LIPASE: CPT | Performed by: PHYSICIAN ASSISTANT

## 2024-04-05 PROCEDURE — 36415 COLL VENOUS BLD VENIPUNCTURE: CPT | Performed by: PHYSICIAN ASSISTANT

## 2024-04-05 PROCEDURE — 80076 HEPATIC FUNCTION PANEL: CPT | Performed by: PHYSICIAN ASSISTANT

## 2024-04-05 PROCEDURE — 94640 AIRWAY INHALATION TREATMENT: CPT

## 2024-04-05 PROCEDURE — 80048 BASIC METABOLIC PNL TOTAL CA: CPT | Performed by: PHYSICIAN ASSISTANT

## 2024-04-05 PROCEDURE — 99284 EMERGENCY DEPT VISIT MOD MDM: CPT

## 2024-04-05 PROCEDURE — 74177 CT ABD & PELVIS W/CONTRAST: CPT

## 2024-04-05 PROCEDURE — 84484 ASSAY OF TROPONIN QUANT: CPT | Performed by: PHYSICIAN ASSISTANT

## 2024-04-05 PROCEDURE — 96374 THER/PROPH/DIAG INJ IV PUSH: CPT

## 2024-04-05 RX ORDER — DICYCLOMINE HCL 20 MG
20 TABLET ORAL 2 TIMES DAILY
Qty: 20 TABLET | Refills: 0 | Status: SHIPPED | OUTPATIENT
Start: 2024-04-05

## 2024-04-05 RX ORDER — FAMOTIDINE 20 MG/1
20 TABLET, FILM COATED ORAL 2 TIMES DAILY
Qty: 30 TABLET | Refills: 0 | Status: SHIPPED | OUTPATIENT
Start: 2024-04-05

## 2024-04-05 RX ORDER — MORPHINE SULFATE 4 MG/ML
4 INJECTION, SOLUTION INTRAMUSCULAR; INTRAVENOUS ONCE
Status: COMPLETED | OUTPATIENT
Start: 2024-04-05 | End: 2024-04-05

## 2024-04-05 RX ORDER — ALBUTEROL SULFATE 90 UG/1
2 AEROSOL, METERED RESPIRATORY (INHALATION) EVERY 6 HOURS PRN
Qty: 8.5 G | Refills: 0 | Status: SHIPPED | OUTPATIENT
Start: 2024-04-05

## 2024-04-05 RX ORDER — IPRATROPIUM BROMIDE AND ALBUTEROL SULFATE 2.5; .5 MG/3ML; MG/3ML
3 SOLUTION RESPIRATORY (INHALATION) ONCE
Status: COMPLETED | OUTPATIENT
Start: 2024-04-05 | End: 2024-04-05

## 2024-04-05 RX ADMIN — MORPHINE SULFATE 4 MG: 4 INJECTION INTRAVENOUS at 09:20

## 2024-04-05 RX ADMIN — IOHEXOL 100 ML: 350 INJECTION, SOLUTION INTRAVENOUS at 10:06

## 2024-04-05 RX ADMIN — IPRATROPIUM BROMIDE AND ALBUTEROL SULFATE 3 ML: 2.5; .5 SOLUTION RESPIRATORY (INHALATION) at 10:11

## 2024-04-05 NOTE — ED PROVIDER NOTES
History  Chief Complaint   Patient presents with    Abdominal Pain     LUQ pain since yesterday      This is a 63-year-old male with past medical history including hypertension, hyperlipidemia, diabetes, tobacco use arriving ambulatory to the emergency department today for evaluation with complaint of left-sided abdominal pain.  Patient reports gradual symptom onset yesterday, with no inciting event to his knowledge.  He reports a dull, aching pain localized to the left side of the abdomen without radiation toward the back.  He does not find that his pain is exacerbated by meals or activity.  Denies nausea, vomiting, diarrhea, constipation.  Denies fevers, chills, sweats.  Denies recent travel, recent antibiotic use, suspicious food intake or ingestion of spoiled foods.  He has no pertinent surgical history.  He denies any injury or trauma.  He denies history of similar symptoms in the past. He offers no other complaints or concerns at this time.        Prior to Admission Medications   Prescriptions Last Dose Informant Patient Reported? Taking?   Cholecalciferol (VITAMIN D3) 1000 UNITS CAPS   Yes No   Sig: Take 1 capsule by mouth daily   Multiple Vitamin (MULTIVITAMIN) tablet   Yes No   Sig: Take 1 tablet by mouth daily   aspirin (ECOTRIN) 325 mg EC tablet   No No   Sig: Take 1 tablet by mouth daily for 30 days   atorvastatin (LIPITOR) 40 mg tablet   No No   Sig: Take 1 tablet by mouth daily for 30 days   clopidogrel (PLAVIX) 75 mg tablet   No No   Sig: Take 1 tablet by mouth daily for 30 days   enalapril (VASOTEC) 5 mg tablet   Yes No   Sig: Take 5 mg by mouth daily   metFORMIN (GLUCOPHAGE) 500 mg tablet   Yes No   Sig: Take 500 mg by mouth 2 (two) times a day with meals   metoprolol tartrate (LOPRESSOR) 25 mg tablet   Yes No   Sig: Take 25 mg by mouth 2 (two) times a day   nitroglycerin (NITROSTAT) 0.4 mg SL tablet   No No   Sig: Place 1 tablet under the tongue every 5 (five) minutes as needed for chest pain for  up to 30 days      Facility-Administered Medications: None       Past Medical History:   Diagnosis Date    Cardiac disease     Diabetes mellitus (HCC)     Hyperlipidemia     Hypertension        No past surgical history on file.    No family history on file.  I have reviewed and agree with the history as documented.    E-Cigarette/Vaping     E-Cigarette/Vaping Substances     Social History     Tobacco Use    Smoking status: Every Day     Current packs/day: 0.20     Types: Cigarettes   Substance Use Topics    Alcohol use: Yes     Comment: socially    Drug use: No       Review of Systems   Constitutional:  Negative for chills, diaphoresis, fatigue and fever.   Respiratory:  Negative for shortness of breath.    Cardiovascular:  Negative for chest pain.   Gastrointestinal:  Positive for abdominal pain. Negative for constipation, diarrhea, nausea and vomiting.   All other systems reviewed and are negative.      Physical Exam  Physical Exam  Vitals and nursing note reviewed.   Constitutional:       General: He is not in acute distress.     Appearance: Normal appearance. He is well-developed. He is not ill-appearing, toxic-appearing or diaphoretic.      Comments: Elevated bmi   HENT:      Head: Normocephalic and atraumatic.      Right Ear: External ear normal.      Left Ear: External ear normal.   Eyes:      Conjunctiva/sclera: Conjunctivae normal.   Cardiovascular:      Rate and Rhythm: Normal rate and regular rhythm.      Pulses: Normal pulses.   Pulmonary:      Effort: Pulmonary effort is normal. No respiratory distress.      Breath sounds: Wheezing (scattered expiratory wheeze on auscultaiton of the posterior lung fields) present. No rhonchi or rales.   Chest:      Chest wall: No tenderness.   Abdominal:      General: There is distension.      Palpations: Abdomen is soft.      Tenderness: There is abdominal tenderness (left-sided).   Musculoskeletal:         General: Normal range of motion.      Cervical back: Normal  range of motion and neck supple.   Skin:     General: Skin is warm and dry.      Capillary Refill: Capillary refill takes less than 2 seconds.   Neurological:      Mental Status: He is alert.      Motor: Motor function is intact. No weakness.      Gait: Gait is intact.   Psychiatric:         Mood and Affect: Mood normal.         Vital Signs  ED Triage Vitals   Temperature Pulse Respirations Blood Pressure SpO2   04/05/24 0850 04/05/24 0850 04/05/24 0850 04/05/24 0850 04/05/24 0850   98.4 °F (36.9 °C) 85 18 (!) 197/95 94 %      Temp Source Heart Rate Source Patient Position - Orthostatic VS BP Location FiO2 (%)   04/05/24 0850 04/05/24 0850 04/05/24 0850 04/05/24 0850 --   Temporal Monitor Sitting Left arm       Pain Score       04/05/24 0901       8           Vitals:    04/05/24 0930 04/05/24 1000 04/05/24 1100 04/05/24 1200   BP: 158/89 165/91 165/79 158/71   Pulse: 65 63 70 64   Patient Position - Orthostatic VS: Lying  Lying Lying         Visual Acuity      ED Medications  Medications   morphine injection 4 mg (4 mg Intravenous Given 4/5/24 0920)   ipratropium-albuterol (DUO-NEB) 0.5-2.5 mg/3 mL inhalation solution 3 mL (3 mL Nebulization Given 4/5/24 1011)   iohexol (OMNIPAQUE) 350 MG/ML injection (MULTI-DOSE) 100 mL (100 mL Intravenous Given 4/5/24 1006)       Diagnostic Studies  Results Reviewed       Procedure Component Value Units Date/Time    HS Troponin I 2hr [[id number]]  (Normal) Collected: 04/05/24 1117    Lab Status: Final result Specimen: Blood from Arm, Right Updated: 04/05/24 1147     hs TnI 2hr 6 ng/L      Delta 2hr hsTnI 0 ng/L     HS Troponin 0hr (reflex protocol) [[id number]]  (Normal) Collected: 04/05/24 0920    Lab Status: Final result Specimen: Blood from Arm, Right Updated: 04/05/24 0951     hs TnI 0hr 6 ng/L     Basic metabolic panel [[id number]]  (Abnormal) Collected: 04/05/24 0920    Lab Status: Final result Specimen: Blood from Arm, Right Updated: 04/05/24 0909     Sodium 135 mmol/L       Potassium 4.2 mmol/L      Chloride 103 mmol/L      CO2 27 mmol/L      ANION GAP 5 mmol/L      BUN 13 mg/dL      Creatinine 0.75 mg/dL      Glucose 183 mg/dL      Calcium 8.8 mg/dL      eGFR 96 ml/min/1.73sq m     Narrative:      National Kidney Disease Foundation guidelines for Chronic Kidney Disease (CKD):     Stage 1 with normal or high GFR (GFR > 90 mL/min/1.73 square meters)    Stage 2 Mild CKD (GFR = 60-89 mL/min/1.73 square meters)    Stage 3A Moderate CKD (GFR = 45-59 mL/min/1.73 square meters)    Stage 3B Moderate CKD (GFR = 30-44 mL/min/1.73 square meters)    Stage 4 Severe CKD (GFR = 15-29 mL/min/1.73 square meters)    Stage 5 End Stage CKD (GFR <15 mL/min/1.73 square meters)  Note: GFR calculation is accurate only with a steady state creatinine    Hepatic function panel [[id number]]  (Abnormal) Collected: 04/05/24 0920    Lab Status: Final result Specimen: Blood from Arm, Right Updated: 04/05/24 0945     Total Bilirubin 0.53 mg/dL      Bilirubin, Direct 0.12 mg/dL      Alkaline Phosphatase 49 U/L      AST 45 U/L      ALT 65 U/L      Total Protein 7.0 g/dL      Albumin 3.8 g/dL     Lipase [[id number]]  (Normal) Collected: 04/05/24 0920    Lab Status: Final result Specimen: Blood from Arm, Right Updated: 04/05/24 0945     Lipase 22 u/L     CBC and differential [[id number]] Collected: 04/05/24 0920    Lab Status: Final result Specimen: Blood from Arm, Right Updated: 04/05/24 0929     WBC 6.08 Thousand/uL      RBC 4.62 Million/uL      Hemoglobin 14.8 g/dL      Hematocrit 44.3 %      MCV 96 fL      MCH 32.0 pg      MCHC 33.4 g/dL      RDW 11.9 %      MPV 9.7 fL      Platelets 190 Thousands/uL      nRBC 0 /100 WBCs      Neutrophils Relative 66 %      Immature Grans % 0 %      Lymphocytes Relative 22 %      Monocytes Relative 7 %      Eosinophils Relative 5 %      Basophils Relative 0 %      Neutrophils Absolute 3.98 Thousands/µL      Absolute Immature Grans 0.01 Thousand/uL      Absolute Lymphocytes 1.32  Thousands/µL      Absolute Monocytes 0.42 Thousand/µL      Eosinophils Absolute 0.33 Thousand/µL      Basophils Absolute 0.02 Thousands/µL                    CT abdomen pelvis with contrast   Final Result by Yamila Paulino MD (04/05 1120)   No bowel obstruction   No acute inflammatory stranding      Bilateral lower lobe pulmonary nodules identified, measuring up to 7 mm on the right.   Based on current Fleischner Society 2017 Guidelines on incidental pulmonary nodule, follow-up non-contrast CT is recommended at 6 months from the initial examination and, if stable at that time, an additional follow-up is recommended for 18-24 months    from the initial examination. If patient is at risk for any metastatic disease shorter interval follow-up at 3 months can be considered      The study was marked in EPIC for immediate notification.               Workstation performed: [id number]         XR chest 1 view portable   Final Result by Merly Estes MD (04/05 1645)      No acute cardiopulmonary disease.            Workstation performed: [id number]                    Procedures  ECG 12 Lead Documentation Only    Date/Time: 4/5/2024 9:15 AM    Performed by: Marli Guardado PA-C  Authorized by: Marli Guardado PA-C    Indications / Diagnosis:  Abd pain  ECG reviewed by me, the ED Provider: yes    Patient location:  ED  Rate:     ECG rate:  70    ECG rate assessment: normal    Rhythm:     Rhythm: sinus rhythm    Ectopy:     Ectopy: none    QRS:     QRS axis:  Left  Conduction:     Conduction: normal    Comments:      No ischemic ST/T wave changes           ED Course                               SBIRT 20yo+      Flowsheet Row Most Recent Value   Initial Alcohol Screen: US AUDIT-C     1. How often do you have a drink containing alcohol? 0 Filed at: 04/05/2024 0851   2. How many drinks containing alcohol do you have on a typical day you are drinking?  0 Filed at: 04/05/2024 0851   3a. Male UNDER 65: How often do you  have five or more drinks on one occasion? 0 Filed at: 04/05/2024 0851   3b. FEMALE Any Age, or MALE 65+: How often do you have 4 or more drinks on one occassion? 0 Filed at: 04/05/2024 0851   Audit-C Score 0 Filed at: 04/05/2024 0851   NITZA: How many times in the past year have you...    Used an illegal drug or used a prescription medication for non-medical reasons? Never Filed at: 04/05/2024 0851                      Medical Decision Making  MDM: This is a 63-year-old male arriving ambulatory to the emergency department today for evaluation with complaint of left-sided abdominal pain.  This developed yesterday with no inciting event to his knowledge.  He denies any accompanying symptoms to include nausea, vomiting, diarrhea, constipation, fevers, chills, sweats, back pain, UTI symptoms, testicular pain.  Denies any injuries or trauma.  Patient's vital signs are stable on presentation with examination as above.  ECG obtained with no ischemic change, troponin within normal limits.  Lab work is notable for slightly elevated LFTs with CT showing borderline steatosis. CT reports no bowel obstruction or inflammatory stranding with mention of incidental pulmonary nodules and recommendation for follow-up CT in 6 months.  Test results reviewed with patient at bedside.  Patient reports resolution of abdominal pain during ED course.  He denies any new or worsening symptoms during ED course.  Patient's stable vital signs, unremarkable examination and negative workup today are reassuring.  Recommended PCP follow-up for reassessment and scheduling of CT chest.  Reflux precautions and additional supportive measures reviewed.  Recommended outpatient gastroenterology follow-up for further management of symptoms persist and parameters for ED return discussed at length.  Patient verbalized understanding of plan as above which he is comfortable and agreeable with and he was discharged in stable condition, ambulating independently from  the emergency department today without difficulty.    Amount and/or Complexity of Data Reviewed  Labs: ordered.  Radiology: ordered.  ECG/medicine tests: ordered.    Risk  Prescription drug management.             Disposition  Final diagnoses:   Nonspecific abdominal pain   Pulmonary nodules     Time reflects when diagnosis was documented in both MDM as applicable and the Disposition within this note       Time User Action Codes Description Comment    4/5/2024 12:15 PM Marli Guardado Add [R10.9] Nonspecific abdominal pain     4/5/2024 12:26 PM Marli Guardado Add [R91.8] Pulmonary nodules           ED Disposition       ED Disposition   Discharge    Condition   Stable    Date/Time   Fri Apr 5, 2024 1215    Comment   Bertrand Michelmaria luzrowan discharge to home/self care.                   Follow-up Information       Follow up With Specialties Details Why Contact Info Additional Information    Watson Chi MD Sleep Medicine, Family Medicine   [address] [place] [id number]  [phone]       [facility] [place] [facility]   [address]  [facility] [address] [phone] [facility] [place], [address], [address] [phone]    [facility] Emergency Department Emergency Medicine  If symptoms worsen [address] [facility] [id number] [phone] [facility] Emergency Department, [address] [place], [place], [address]            Discharge Medication List as of 4/5/2024 12:28 PM        START taking these medications    Details   albuterol (ProAir HFA) 90 mcg/act inhaler Inhale 2 puffs every 6 (six) hours as needed for wheezing, Starting Fri 4/5/2024, Normal      dicyclomine (BENTYL) 20 mg tablet Take 1 tablet (20 mg total) by mouth 2 (two) times a day, Starting Fri 4/5/2024, Normal      famotidine  (PEPCID) 20 mg tablet Take 1 tablet (20 mg total) by mouth 2 (two) times a day, Starting Fri 4/5/2024, Normal           CONTINUE these medications which have NOT CHANGED    Details   aspirin (ECOTRIN) 325 mg EC tablet Take 1 tablet by mouth daily for 30 days, Starting 1/20/2017, Until Sun 2/19/17, No Print      atorvastatin (LIPITOR) 40 mg tablet Take 1 tablet by mouth daily for 30 days, Starting 1/20/2017, Until Sun 2/19/17, Normal      Cholecalciferol (VITAMIN D3) 1000 UNITS CAPS Take 1 capsule by mouth daily, Until Discontinued, Historical Med      clopidogrel (PLAVIX) 75 mg tablet Take 1 tablet by mouth daily for 30 days, Starting 1/20/2017, Until Sun 2/19/17, Normal      enalapril (VASOTEC) 5 mg tablet Take 5 mg by mouth daily, Until Discontinued, Historical Med      metFORMIN (GLUCOPHAGE) 500 mg tablet Take 500 mg by mouth 2 (two) times a day with meals, Until Discontinued, Historical Med      metoprolol tartrate (LOPRESSOR) 25 mg tablet Take 25 mg by mouth 2 (two) times a day, Until Discontinued, Historical Med      Multiple Vitamin (MULTIVITAMIN) tablet Take 1 tablet by mouth daily, Until Discontinued, Historical Med      nitroglycerin (NITROSTAT) 0.4 mg SL tablet Place 1 tablet under the tongue every 5 (five) minutes as needed for chest pain for up to 30 days, Starting 1/20/2017, Until Sun 2/19/17, Normal             No discharge procedures on file.    PDMP Review       None            ED Provider  Electronically Signed by             Marli Guardado PA-C  04/05/24 9959

## 2024-04-05 NOTE — DISCHARGE INSTRUCTIONS
Recommend avoiding consumption for spicy and acidic foods, avoiding late time meals.  Outpatient GI follow up as above if symptoms persist.  Please return to the ED with any new or worsening symptoms as discussed.